# Patient Record
Sex: FEMALE | Race: WHITE | NOT HISPANIC OR LATINO | Employment: FULL TIME | URBAN - METROPOLITAN AREA
[De-identification: names, ages, dates, MRNs, and addresses within clinical notes are randomized per-mention and may not be internally consistent; named-entity substitution may affect disease eponyms.]

---

## 2019-01-17 ENCOUNTER — HOSPITAL ENCOUNTER (OUTPATIENT)
Dept: URGENT CARE | Facility: CLINIC | Age: 24
Discharge: HOME | End: 2019-01-17
Attending: PHYSICIAN ASSISTANT

## 2019-01-17 LAB
C TRACH RRNA CVX QL NAA+PROBE: NOT DETECTED
N GONORRHOEA DNA SPEC QL NAA+PROBE: NOT DETECTED

## 2019-01-19 LAB — BACTERIA UR CULT: NORMAL

## 2019-01-24 ENCOUNTER — HOSPITAL ENCOUNTER (OUTPATIENT)
Dept: URGENT CARE | Facility: CLINIC | Age: 24
Discharge: HOME | End: 2019-01-24

## 2019-01-24 LAB
C TRACH RRNA CVX QL NAA+PROBE: NOT DETECTED
N GONORRHOEA DNA SPEC QL NAA+PROBE: NOT DETECTED

## 2019-02-05 ENCOUNTER — HOSPITAL ENCOUNTER (OUTPATIENT)
Dept: URGENT CARE | Facility: CLINIC | Age: 24
Discharge: HOME | End: 2019-02-05

## 2019-02-08 LAB — BACTERIA SPEC AEROBE CULT: NORMAL

## 2019-03-22 ENCOUNTER — OFFICE VISIT CONVERTED (OUTPATIENT)
Dept: OTOLARYNGOLOGY | Facility: CLINIC | Age: 24
End: 2019-03-22
Attending: OTOLARYNGOLOGY

## 2019-03-26 ENCOUNTER — HOSPITAL ENCOUNTER (OUTPATIENT)
Dept: PERIOP | Facility: HOSPITAL | Age: 24
Setting detail: HOSPITAL OUTPATIENT SURGERY
Discharge: HOME | End: 2019-03-26
Attending: OTOLARYNGOLOGY

## 2019-03-26 LAB — HCG UR QL: NEGATIVE

## 2019-03-31 ENCOUNTER — HOSPITAL ENCOUNTER (OUTPATIENT)
Dept: URGENT CARE | Facility: CLINIC | Age: 24
Discharge: HOME | End: 2019-03-31
Attending: FAMILY MEDICINE

## 2019-04-02 LAB — BACTERIA SPEC AEROBE CULT: NORMAL

## 2019-04-03 ENCOUNTER — OFFICE VISIT CONVERTED (OUTPATIENT)
Dept: OTOLARYNGOLOGY | Facility: CLINIC | Age: 24
End: 2019-04-03
Attending: OTOLARYNGOLOGY

## 2019-05-10 ENCOUNTER — HOSPITAL ENCOUNTER (OUTPATIENT)
Dept: URGENT CARE | Facility: CLINIC | Age: 24
Discharge: HOME | End: 2019-05-10
Attending: FAMILY MEDICINE

## 2019-05-10 LAB
C TRACH RRNA CVX QL NAA+PROBE: NOT DETECTED
N GONORRHOEA DNA SPEC QL NAA+PROBE: NOT DETECTED

## 2019-05-12 LAB — BACTERIA UR CULT: NORMAL

## 2019-07-11 ENCOUNTER — HOSPITAL ENCOUNTER (OUTPATIENT)
Dept: URGENT CARE | Facility: CLINIC | Age: 24
Discharge: HOME | End: 2019-07-11
Attending: FAMILY MEDICINE

## 2019-07-14 LAB — BACTERIA UR CULT: NORMAL

## 2019-07-22 ENCOUNTER — HOSPITAL ENCOUNTER (OUTPATIENT)
Dept: URGENT CARE | Facility: CLINIC | Age: 24
Discharge: HOME | End: 2019-07-22

## 2019-08-05 ENCOUNTER — HOSPITAL ENCOUNTER (OUTPATIENT)
Dept: URGENT CARE | Facility: CLINIC | Age: 24
Discharge: HOME | End: 2019-08-05
Attending: FAMILY MEDICINE

## 2019-08-25 ENCOUNTER — HOSPITAL ENCOUNTER (OUTPATIENT)
Dept: URGENT CARE | Facility: CLINIC | Age: 24
Discharge: HOME | End: 2019-08-25
Attending: FAMILY MEDICINE

## 2019-08-26 LAB
BASOPHILS # BLD AUTO: 0.07 10*3/UL (ref 0–0.2)
BASOPHILS NFR BLD AUTO: 0.8 % (ref 0–3)
CONV ABS IMM GRAN: 0.02 10*3/UL (ref 0–0.2)
CONV IMMATURE GRAN: 0.2 % (ref 0–1.8)
DEPRECATED RDW RBC AUTO: 47.3 FL (ref 36.4–46.3)
EOSINOPHIL # BLD AUTO: 0.19 10*3/UL (ref 0–0.7)
EOSINOPHIL # BLD AUTO: 2.1 % (ref 0–7)
ERYTHROCYTE [DISTWIDTH] IN BLOOD BY AUTOMATED COUNT: 15.8 % (ref 11.7–14.4)
HCG INTACT+B SERPL-ACNC: <0.5 M[IU]/ML (ref 0–5)
HCT VFR BLD AUTO: 37.4 % (ref 37–47)
HGB BLD-MCNC: 11.7 G/DL (ref 12–16)
LYMPHOCYTES # BLD AUTO: 1.88 10*3/UL (ref 1–5)
LYMPHOCYTES NFR BLD AUTO: 20.5 % (ref 20–45)
MCH RBC QN AUTO: 25.7 PG (ref 27–31)
MCHC RBC AUTO-ENTMCNC: 31.3 G/DL (ref 33–37)
MCV RBC AUTO: 82.2 FL (ref 81–99)
MONOCYTES # BLD AUTO: 0.7 10*3/UL (ref 0.2–1.2)
MONOCYTES NFR BLD AUTO: 7.6 % (ref 3–10)
NEUTROPHILS # BLD AUTO: 6.3 10*3/UL (ref 2–8)
NEUTROPHILS NFR BLD AUTO: 68.8 % (ref 30–85)
NRBC CBCN: 0 % (ref 0–0.7)
PLATELET # BLD AUTO: 232 10*3/UL (ref 130–400)
PMV BLD AUTO: 12.9 FL (ref 9.4–12.3)
RBC # BLD AUTO: 4.55 10*6/UL (ref 4.2–5.4)
WBC # BLD AUTO: 9.16 10*3/UL (ref 4.8–10.8)

## 2019-08-29 ENCOUNTER — HOSPITAL ENCOUNTER (OUTPATIENT)
Dept: PERIOP | Facility: HOSPITAL | Age: 24
Setting detail: HOSPITAL OUTPATIENT SURGERY
Discharge: HOME | End: 2019-08-29
Attending: OBSTETRICS & GYNECOLOGY

## 2019-08-29 LAB
ABO GROUP BLD: NORMAL
BLD GP AB SCN SERPL QL: NORMAL
CONV ABD CONTROL: NORMAL
RH BLD: NORMAL

## 2019-09-24 ENCOUNTER — HOSPITAL ENCOUNTER (OUTPATIENT)
Dept: URGENT CARE | Facility: CLINIC | Age: 24
Discharge: HOME | End: 2019-09-24

## 2019-11-05 ENCOUNTER — HOSPITAL ENCOUNTER (OUTPATIENT)
Dept: URGENT CARE | Facility: CLINIC | Age: 24
Discharge: HOME | End: 2019-11-05

## 2019-11-05 ENCOUNTER — HOSPITAL ENCOUNTER (OUTPATIENT)
Dept: PREADMISSION TESTING | Facility: HOSPITAL | Age: 24
Discharge: HOME | End: 2019-11-05
Attending: OBSTETRICS & GYNECOLOGY

## 2019-11-06 LAB
C TRACH RRNA CVX QL NAA+PROBE: NOT DETECTED
N GONORRHOEA DNA SPEC QL NAA+PROBE: NOT DETECTED

## 2020-01-21 ENCOUNTER — HOSPITAL ENCOUNTER (OUTPATIENT)
Dept: URGENT CARE | Facility: CLINIC | Age: 25
Discharge: HOME | End: 2020-01-21

## 2020-01-23 LAB — BACTERIA SPEC AEROBE CULT: ABNORMAL

## 2020-03-10 ENCOUNTER — HOSPITAL ENCOUNTER (OUTPATIENT)
Dept: URGENT CARE | Facility: CLINIC | Age: 25
Discharge: HOME | End: 2020-03-10
Attending: FAMILY MEDICINE

## 2020-03-12 ENCOUNTER — HOSPITAL ENCOUNTER (OUTPATIENT)
Dept: URGENT CARE | Facility: CLINIC | Age: 25
Discharge: HOME | End: 2020-03-12
Attending: NURSE PRACTITIONER

## 2020-03-12 LAB — BACTERIA SPEC AEROBE CULT: NORMAL

## 2020-03-14 LAB — BACTERIA SPEC AEROBE CULT: NORMAL

## 2020-05-06 ENCOUNTER — HOSPITAL ENCOUNTER (OUTPATIENT)
Dept: URGENT CARE | Facility: CLINIC | Age: 25
Discharge: HOME | End: 2020-05-06
Attending: NURSE PRACTITIONER

## 2020-05-08 LAB
BACTERIA SPEC AEROBE CULT: NORMAL
SARS-COV-2 RNA SPEC QL NAA+PROBE: NOT DETECTED

## 2020-06-11 ENCOUNTER — HOSPITAL ENCOUNTER (OUTPATIENT)
Dept: CT IMAGING | Facility: HOSPITAL | Age: 25
Discharge: HOME | End: 2020-06-11
Attending: NURSE PRACTITIONER

## 2020-09-07 ENCOUNTER — HOSPITAL ENCOUNTER (OUTPATIENT)
Dept: URGENT CARE | Facility: CLINIC | Age: 25
Discharge: HOME | End: 2020-09-07
Attending: PHYSICIAN ASSISTANT

## 2020-10-30 ENCOUNTER — OFFICE VISIT CONVERTED (OUTPATIENT)
Dept: UROLOGY | Facility: CLINIC | Age: 25
End: 2020-10-30
Attending: NURSE PRACTITIONER

## 2020-11-10 ENCOUNTER — HOSPITAL ENCOUNTER (OUTPATIENT)
Dept: OTHER | Facility: HOSPITAL | Age: 25
Discharge: HOME | End: 2020-11-10
Attending: NURSE PRACTITIONER

## 2020-11-12 ENCOUNTER — TELEPHONE CONVERTED (OUTPATIENT)
Dept: UROLOGY | Facility: CLINIC | Age: 25
End: 2020-11-12
Attending: NURSE PRACTITIONER

## 2020-11-14 ENCOUNTER — HOSPITAL ENCOUNTER (OUTPATIENT)
Dept: URGENT CARE | Facility: CLINIC | Age: 25
Discharge: HOME | End: 2020-11-14

## 2020-11-18 LAB — SARS-COV-2 RNA SPEC QL NAA+PROBE: NOT DETECTED

## 2021-01-31 ENCOUNTER — HOSPITAL ENCOUNTER (OUTPATIENT)
Dept: URGENT CARE | Facility: CLINIC | Age: 26
Discharge: HOME | End: 2021-01-31

## 2021-02-02 LAB — BACTERIA SPEC AEROBE CULT: ABNORMAL

## 2021-04-10 ENCOUNTER — HOSPITAL ENCOUNTER (OUTPATIENT)
Dept: URGENT CARE | Facility: CLINIC | Age: 26
Discharge: HOME | End: 2021-04-10
Attending: NURSE PRACTITIONER

## 2021-04-11 LAB — SARS-COV-2 RNA SPEC QL NAA+PROBE: NOT DETECTED

## 2021-04-12 LAB — BACTERIA SPEC AEROBE CULT: NORMAL

## 2021-05-14 ENCOUNTER — HOSPITAL ENCOUNTER (OUTPATIENT)
Dept: PREADMISSION TESTING | Facility: HOSPITAL | Age: 26
Discharge: HOME | End: 2021-05-14
Attending: OBSTETRICS & GYNECOLOGY

## 2021-05-14 VITALS — HEIGHT: 60 IN | BODY MASS INDEX: 25.32 KG/M2 | WEIGHT: 129 LBS | RESPIRATION RATE: 12 BRPM

## 2021-05-14 LAB
BASOPHILS # BLD AUTO: 0.09 10*3/UL (ref 0–0.2)
BASOPHILS NFR BLD AUTO: 1.2 % (ref 0–3)
CONV ABS IMM GRAN: 0.01 10*3/UL (ref 0–0.2)
CONV IMMATURE GRAN: 0.1 % (ref 0–1.8)
DEPRECATED RDW RBC AUTO: 41.8 FL (ref 36.4–46.3)
EOSINOPHIL # BLD AUTO: 0.13 10*3/UL (ref 0–0.7)
EOSINOPHIL # BLD AUTO: 1.7 % (ref 0–7)
ERYTHROCYTE [DISTWIDTH] IN BLOOD BY AUTOMATED COUNT: 12.9 % (ref 11.7–14.4)
HCG INTACT+B SERPL-ACNC: <0.5 M[IU]/ML (ref 0–5)
HCT VFR BLD AUTO: 40.1 % (ref 37–47)
HGB BLD-MCNC: 13.6 G/DL (ref 12–16)
LYMPHOCYTES # BLD AUTO: 2 10*3/UL (ref 1–5)
LYMPHOCYTES NFR BLD AUTO: 26.5 % (ref 20–45)
MCH RBC QN AUTO: 30 PG (ref 27–31)
MCHC RBC AUTO-ENTMCNC: 33.9 G/DL (ref 33–37)
MCV RBC AUTO: 88.3 FL (ref 81–99)
MONOCYTES # BLD AUTO: 0.53 10*3/UL (ref 0.2–1.2)
MONOCYTES NFR BLD AUTO: 7 % (ref 3–10)
NEUTROPHILS # BLD AUTO: 4.8 10*3/UL (ref 2–8)
NEUTROPHILS NFR BLD AUTO: 63.5 % (ref 30–85)
NRBC CBCN: 0 % (ref 0–0.7)
PLATELET # BLD AUTO: 236 10*3/UL (ref 130–400)
PMV BLD AUTO: 12.1 FL (ref 9.4–12.3)
RBC # BLD AUTO: 4.54 10*6/UL (ref 4.2–5.4)
SARS-COV-2 RNA SPEC QL NAA+PROBE: NOT DETECTED
WBC # BLD AUTO: 7.56 10*3/UL (ref 4.8–10.8)

## 2021-05-15 VITALS
SYSTOLIC BLOOD PRESSURE: 116 MMHG | BODY MASS INDEX: 24.05 KG/M2 | HEIGHT: 60 IN | WEIGHT: 122.5 LBS | TEMPERATURE: 98.8 F | RESPIRATION RATE: 15 BRPM | DIASTOLIC BLOOD PRESSURE: 75 MMHG | OXYGEN SATURATION: 99 % | HEART RATE: 83 BPM

## 2021-05-15 VITALS
OXYGEN SATURATION: 99 % | WEIGHT: 125.5 LBS | DIASTOLIC BLOOD PRESSURE: 73 MMHG | BODY MASS INDEX: 24.64 KG/M2 | HEIGHT: 60 IN | RESPIRATION RATE: 16 BRPM | SYSTOLIC BLOOD PRESSURE: 128 MMHG | TEMPERATURE: 98.7 F | HEART RATE: 97 BPM

## 2021-05-20 ENCOUNTER — HOSPITAL ENCOUNTER (OUTPATIENT)
Dept: PERIOP | Facility: HOSPITAL | Age: 26
Setting detail: HOSPITAL OUTPATIENT SURGERY
Discharge: HOME | End: 2021-05-21
Attending: OBSTETRICS & GYNECOLOGY

## 2021-05-20 LAB
ABO GROUP BLD: NORMAL
BLD GP AB SCN SERPL QL: NORMAL
CONV ABD CONTROL: NORMAL
HCT VFR BLD AUTO: 35.9 % (ref 37–47)
HGB BLD-MCNC: 12.3 G/DL (ref 12–16)
RH BLD: NORMAL

## 2021-05-21 LAB
BASOPHILS # BLD AUTO: 0.04 10*3/UL (ref 0–0.2)
BASOPHILS NFR BLD AUTO: 0.4 % (ref 0–3)
CONV ABS IMM GRAN: 0.06 10*3/UL (ref 0–0.2)
CONV IMMATURE GRAN: 0.6 % (ref 0–1.8)
DEPRECATED RDW RBC AUTO: 41 FL (ref 36.4–46.3)
EOSINOPHIL # BLD AUTO: 0.06 10*3/UL (ref 0–0.7)
EOSINOPHIL # BLD AUTO: 0.6 % (ref 0–7)
ERYTHROCYTE [DISTWIDTH] IN BLOOD BY AUTOMATED COUNT: 12.6 % (ref 11.7–14.4)
HCT VFR BLD AUTO: 30.7 % (ref 37–47)
HGB BLD-MCNC: 10.1 G/DL (ref 12–16)
LYMPHOCYTES # BLD AUTO: 2.76 10*3/UL (ref 1–5)
LYMPHOCYTES NFR BLD AUTO: 27.2 % (ref 20–45)
MCH RBC QN AUTO: 29.5 PG (ref 27–31)
MCHC RBC AUTO-ENTMCNC: 32.9 G/DL (ref 33–37)
MCV RBC AUTO: 89.8 FL (ref 81–99)
MONOCYTES # BLD AUTO: 0.66 10*3/UL (ref 0.2–1.2)
MONOCYTES NFR BLD AUTO: 6.5 % (ref 3–10)
NEUTROPHILS # BLD AUTO: 6.56 10*3/UL (ref 2–8)
NEUTROPHILS NFR BLD AUTO: 64.7 % (ref 30–85)
NRBC CBCN: 0 % (ref 0–0.7)
PLATELET # BLD AUTO: 166 10*3/UL (ref 130–400)
PMV BLD AUTO: 11.4 FL (ref 9.4–12.3)
RBC # BLD AUTO: 3.42 10*6/UL (ref 4.2–5.4)
WBC # BLD AUTO: 10.14 10*3/UL (ref 4.8–10.8)

## 2021-08-12 PROCEDURE — U0003 INFECTIOUS AGENT DETECTION BY NUCLEIC ACID (DNA OR RNA); SEVERE ACUTE RESPIRATORY SYNDROME CORONAVIRUS 2 (SARS-COV-2) (CORONAVIRUS DISEASE [COVID-19]), AMPLIFIED PROBE TECHNIQUE, MAKING USE OF HIGH THROUGHPUT TECHNOLOGIES AS DESCRIBED BY CMS-2020-01-R: HCPCS | Performed by: PHYSICIAN ASSISTANT

## 2022-01-19 ENCOUNTER — TELEPHONE (OUTPATIENT)
Dept: OBSTETRICS AND GYNECOLOGY | Facility: CLINIC | Age: 27
End: 2022-01-19

## 2022-01-31 ENCOUNTER — OFFICE VISIT (OUTPATIENT)
Dept: OBSTETRICS AND GYNECOLOGY | Facility: CLINIC | Age: 27
End: 2022-01-31

## 2022-01-31 VITALS
DIASTOLIC BLOOD PRESSURE: 77 MMHG | WEIGHT: 139 LBS | SYSTOLIC BLOOD PRESSURE: 119 MMHG | HEART RATE: 76 BPM | HEIGHT: 60 IN | BODY MASS INDEX: 27.29 KG/M2

## 2022-01-31 DIAGNOSIS — Z72.0 TOBACCO ABUSE: ICD-10-CM

## 2022-01-31 DIAGNOSIS — R10.2 PELVIC PAIN: Primary | ICD-10-CM

## 2022-01-31 PROBLEM — J34.2 NASAL SEPTAL DEFORMITY: Status: ACTIVE | Noted: 2022-01-31

## 2022-01-31 PROBLEM — F41.9 ANXIETY: Status: ACTIVE | Noted: 2022-01-31

## 2022-01-31 PROCEDURE — 99213 OFFICE O/P EST LOW 20 MIN: CPT | Performed by: OBSTETRICS & GYNECOLOGY

## 2022-02-01 PROBLEM — R10.2 PELVIC PAIN: Status: ACTIVE | Noted: 2022-02-01

## 2022-02-16 ENCOUNTER — HOSPITAL ENCOUNTER (OUTPATIENT)
Dept: ULTRASOUND IMAGING | Facility: HOSPITAL | Age: 27
Discharge: HOME | End: 2022-02-16
Admitting: OBSTETRICS & GYNECOLOGY

## 2022-02-16 DIAGNOSIS — R10.2 PELVIC PAIN: ICD-10-CM

## 2022-02-16 PROCEDURE — 76856 US EXAM PELVIC COMPLETE: CPT

## 2022-02-16 PROCEDURE — 76830 TRANSVAGINAL US NON-OB: CPT

## 2022-03-08 ENCOUNTER — OFFICE VISIT (OUTPATIENT)
Dept: OBSTETRICS AND GYNECOLOGY | Facility: CLINIC | Age: 27
End: 2022-03-08

## 2022-03-08 VITALS
DIASTOLIC BLOOD PRESSURE: 79 MMHG | WEIGHT: 137.4 LBS | SYSTOLIC BLOOD PRESSURE: 111 MMHG | BODY MASS INDEX: 26.83 KG/M2 | HEART RATE: 76 BPM

## 2022-03-08 DIAGNOSIS — R10.2 PELVIC PAIN: Primary | ICD-10-CM

## 2022-03-08 PROCEDURE — 99213 OFFICE O/P EST LOW 20 MIN: CPT | Performed by: OBSTETRICS & GYNECOLOGY

## 2022-03-14 ENCOUNTER — OFFICE VISIT (OUTPATIENT)
Dept: GASTROENTEROLOGY | Facility: CLINIC | Age: 27
End: 2022-03-14

## 2022-03-14 VITALS
HEART RATE: 93 BPM | DIASTOLIC BLOOD PRESSURE: 80 MMHG | HEIGHT: 60 IN | BODY MASS INDEX: 27.48 KG/M2 | SYSTOLIC BLOOD PRESSURE: 122 MMHG | WEIGHT: 140 LBS

## 2022-03-14 DIAGNOSIS — R10.31 RIGHT LOWER QUADRANT ABDOMINAL PAIN: ICD-10-CM

## 2022-03-14 DIAGNOSIS — R10.11 RIGHT UPPER QUADRANT ABDOMINAL PAIN: Primary | ICD-10-CM

## 2022-03-14 DIAGNOSIS — R15.2 FECAL URGENCY: ICD-10-CM

## 2022-03-14 PROCEDURE — 99214 OFFICE O/P EST MOD 30 MIN: CPT | Performed by: NURSE PRACTITIONER

## 2022-03-14 RX ORDER — DICYCLOMINE HYDROCHLORIDE 10 MG/1
10 CAPSULE ORAL 4 TIMES DAILY PRN
Qty: 120 CAPSULE | Refills: 3 | Status: SHIPPED | OUTPATIENT
Start: 2022-03-14 | End: 2022-04-27

## 2022-03-14 RX ORDER — POLYETHYLENE GLYCOL 3350 17 G/17G
17 POWDER, FOR SOLUTION ORAL DAILY
Qty: 30 EACH | Refills: 3 | Status: SHIPPED | OUTPATIENT
Start: 2022-03-14 | End: 2022-04-27

## 2022-03-24 ENCOUNTER — PATIENT ROUNDING (BHMG ONLY) (OUTPATIENT)
Dept: GASTROENTEROLOGY | Facility: CLINIC | Age: 27
End: 2022-03-24

## 2022-04-01 ENCOUNTER — HOSPITAL ENCOUNTER (OUTPATIENT)
Dept: NUCLEAR MEDICINE | Facility: HOSPITAL | Age: 27
Discharge: HOME | End: 2022-04-01

## 2022-04-01 ENCOUNTER — HOSPITAL ENCOUNTER (OUTPATIENT)
Dept: ULTRASOUND IMAGING | Facility: HOSPITAL | Age: 27
Discharge: HOME | End: 2022-04-01

## 2022-04-01 DIAGNOSIS — R15.2 FECAL URGENCY: ICD-10-CM

## 2022-04-01 DIAGNOSIS — R10.11 RIGHT UPPER QUADRANT ABDOMINAL PAIN: ICD-10-CM

## 2022-04-01 PROCEDURE — 76705 ECHO EXAM OF ABDOMEN: CPT

## 2022-04-01 PROCEDURE — A9537 TC99M MEBROFENIN: HCPCS | Performed by: NURSE PRACTITIONER

## 2022-04-01 PROCEDURE — 78227 HEPATOBIL SYST IMAGE W/DRUG: CPT

## 2022-04-01 PROCEDURE — 0 TECHNETIUM TC 99M MEBROFENIN KIT: Performed by: NURSE PRACTITIONER

## 2022-04-01 RX ORDER — KIT FOR THE PREPARATION OF TECHNETIUM TC 99M MEBROFENIN 45 MG/10ML
1 INJECTION, POWDER, LYOPHILIZED, FOR SOLUTION INTRAVENOUS
Status: COMPLETED | OUTPATIENT
Start: 2022-04-01 | End: 2022-04-01

## 2022-04-01 RX ADMIN — KIT FOR THE PREPARATION OF TECHNETIUM TC 99M MEBROFENIN 1 DOSE: 45 INJECTION, POWDER, LYOPHILIZED, FOR SOLUTION INTRAVENOUS at 11:38

## 2022-07-14 ENCOUNTER — TRANSCRIBE ORDERS (OUTPATIENT)
Dept: ADMINISTRATIVE | Facility: HOSPITAL | Age: 27
End: 2022-07-14

## 2022-07-14 DIAGNOSIS — R10.2 PELVIC PAIN: Primary | ICD-10-CM

## 2022-07-25 ENCOUNTER — HOSPITAL ENCOUNTER (OUTPATIENT)
Dept: ULTRASOUND IMAGING | Facility: HOSPITAL | Age: 27
Discharge: HOME | End: 2022-07-25
Admitting: NURSE PRACTITIONER

## 2022-07-25 DIAGNOSIS — R10.2 PELVIC PAIN: ICD-10-CM

## 2022-07-25 PROCEDURE — 76856 US EXAM PELVIC COMPLETE: CPT

## 2022-11-28 ENCOUNTER — TRANSCRIBE ORDERS (OUTPATIENT)
Dept: ADMINISTRATIVE | Facility: HOSPITAL | Age: 27
End: 2022-11-28

## 2022-11-28 ENCOUNTER — HOSPITAL ENCOUNTER (OUTPATIENT)
Dept: GENERAL RADIOLOGY | Facility: HOSPITAL | Age: 27
Discharge: HOME | End: 2022-11-28
Admitting: NURSE PRACTITIONER

## 2022-11-28 DIAGNOSIS — R05.9 COUGH, UNSPECIFIED TYPE: Primary | ICD-10-CM

## 2022-11-28 DIAGNOSIS — R06.89 DECREASED BREATH SOUNDS: ICD-10-CM

## 2022-11-28 DIAGNOSIS — R05.9 COUGH, UNSPECIFIED TYPE: ICD-10-CM

## 2022-11-28 PROCEDURE — 71046 X-RAY EXAM CHEST 2 VIEWS: CPT

## 2024-02-12 ENCOUNTER — TRANSCRIBE ORDERS (OUTPATIENT)
Dept: ADMINISTRATIVE | Facility: HOSPITAL | Age: 29
End: 2024-02-12
Payer: COMMERCIAL

## 2024-02-12 DIAGNOSIS — G43.809 OTHER MIGRAINE WITHOUT STATUS MIGRAINOSUS, NOT INTRACTABLE: Primary | ICD-10-CM

## 2024-03-21 ENCOUNTER — HOSPITAL ENCOUNTER (OUTPATIENT)
Dept: MRI IMAGING | Facility: HOSPITAL | Age: 29
Discharge: HOME | End: 2024-03-21
Admitting: NURSE PRACTITIONER
Payer: COMMERCIAL

## 2024-03-21 DIAGNOSIS — G43.809 OTHER MIGRAINE WITHOUT STATUS MIGRAINOSUS, NOT INTRACTABLE: ICD-10-CM

## 2024-03-21 PROCEDURE — A9577 INJ MULTIHANCE: HCPCS | Performed by: NURSE PRACTITIONER

## 2024-03-21 PROCEDURE — 70553 MRI BRAIN STEM W/O & W/DYE: CPT

## 2024-03-21 PROCEDURE — 0 GADOBENATE DIMEGLUMINE 529 MG/ML SOLUTION: Performed by: NURSE PRACTITIONER

## 2024-03-21 RX ADMIN — GADOBENATE DIMEGLUMINE 13 ML: 529 INJECTION, SOLUTION INTRAVENOUS at 14:52

## 2024-04-21 PROCEDURE — 87635 SARS-COV-2 COVID-19 AMP PRB: CPT | Performed by: PHYSICIAN ASSISTANT

## 2024-06-03 ENCOUNTER — TRANSCRIBE ORDERS (OUTPATIENT)
Dept: ADMINISTRATIVE | Facility: HOSPITAL | Age: 29
End: 2024-06-03
Payer: COMMERCIAL

## 2024-06-03 DIAGNOSIS — R60.0 LEG EDEMA: ICD-10-CM

## 2024-06-03 DIAGNOSIS — R60.0 LOCALIZED EDEMA: Primary | ICD-10-CM

## 2024-06-06 ENCOUNTER — HOSPITAL ENCOUNTER (OUTPATIENT)
Dept: CARDIOLOGY | Facility: HOSPITAL | Age: 29
Discharge: HOME | End: 2024-06-06
Payer: COMMERCIAL

## 2024-06-06 DIAGNOSIS — R60.0 LEG EDEMA: ICD-10-CM

## 2024-06-06 LAB
BH CV LOWER VASCULAR LEFT COMMON FEMORAL AUGMENT: NORMAL
BH CV LOWER VASCULAR LEFT COMMON FEMORAL COMPETENT: NORMAL
BH CV LOWER VASCULAR LEFT COMMON FEMORAL COMPRESS: NORMAL
BH CV LOWER VASCULAR LEFT COMMON FEMORAL PHASIC: NORMAL
BH CV LOWER VASCULAR LEFT COMMON FEMORAL SPONT: NORMAL
BH CV LOWER VASCULAR LEFT DISTAL FEMORAL COMPRESS: NORMAL
BH CV LOWER VASCULAR LEFT GASTRONEMIUS COMPRESS: NORMAL
BH CV LOWER VASCULAR LEFT GREATER SAPH AK AUGMENT: NORMAL
BH CV LOWER VASCULAR LEFT GREATER SAPH AK COMPETENT: NORMAL
BH CV LOWER VASCULAR LEFT GREATER SAPH AK COMPRESS: NORMAL
BH CV LOWER VASCULAR LEFT GREATER SAPH AK PHASIC: NORMAL
BH CV LOWER VASCULAR LEFT GREATER SAPH AK SPONT: NORMAL
BH CV LOWER VASCULAR LEFT GREATER SAPH BK COMPRESS: NORMAL
BH CV LOWER VASCULAR LEFT LESSER SAPH COMPRESS: NORMAL
BH CV LOWER VASCULAR LEFT MID FEMORAL AUGMENT: NORMAL
BH CV LOWER VASCULAR LEFT MID FEMORAL COMPETENT: NORMAL
BH CV LOWER VASCULAR LEFT MID FEMORAL COMPRESS: NORMAL
BH CV LOWER VASCULAR LEFT MID FEMORAL PHASIC: NORMAL
BH CV LOWER VASCULAR LEFT MID FEMORAL SPONT: NORMAL
BH CV LOWER VASCULAR LEFT PERONEAL COMPRESS: NORMAL
BH CV LOWER VASCULAR LEFT POPLITEAL AUGMENT: NORMAL
BH CV LOWER VASCULAR LEFT POPLITEAL COMPETENT: NORMAL
BH CV LOWER VASCULAR LEFT POPLITEAL COMPRESS: NORMAL
BH CV LOWER VASCULAR LEFT POPLITEAL PHASIC: NORMAL
BH CV LOWER VASCULAR LEFT POPLITEAL SPONT: NORMAL
BH CV LOWER VASCULAR LEFT POSTERIOR TIBIAL AUGMENT: NORMAL
BH CV LOWER VASCULAR LEFT POSTERIOR TIBIAL COMPETENT: NORMAL
BH CV LOWER VASCULAR LEFT POSTERIOR TIBIAL COMPRESS: NORMAL
BH CV LOWER VASCULAR LEFT POSTERIOR TIBIAL PHASIC: NORMAL
BH CV LOWER VASCULAR LEFT POSTERIOR TIBIAL SPONT: NORMAL
BH CV LOWER VASCULAR LEFT PROXIMAL FEMORAL COMPRESS: NORMAL
BH CV LOWER VASCULAR RIGHT COMMON FEMORAL AUGMENT: NORMAL
BH CV LOWER VASCULAR RIGHT COMMON FEMORAL COMPETENT: NORMAL
BH CV LOWER VASCULAR RIGHT COMMON FEMORAL COMPRESS: NORMAL
BH CV LOWER VASCULAR RIGHT COMMON FEMORAL PHASIC: NORMAL
BH CV LOWER VASCULAR RIGHT COMMON FEMORAL SPONT: NORMAL

## 2024-06-06 PROCEDURE — 93971 EXTREMITY STUDY: CPT

## 2024-10-01 ENCOUNTER — LAB (OUTPATIENT)
Dept: LAB | Facility: HOSPITAL | Age: 29
End: 2024-10-01
Payer: COMMERCIAL

## 2024-10-01 ENCOUNTER — PATIENT ROUNDING (BHMG ONLY) (OUTPATIENT)
Dept: NEUROLOGY | Facility: CLINIC | Age: 29
End: 2024-10-01
Payer: COMMERCIAL

## 2024-10-01 ENCOUNTER — OFFICE VISIT (OUTPATIENT)
Dept: NEUROLOGY | Facility: CLINIC | Age: 29
End: 2024-10-01
Payer: COMMERCIAL

## 2024-10-01 VITALS
HEART RATE: 82 BPM | WEIGHT: 144.8 LBS | SYSTOLIC BLOOD PRESSURE: 114 MMHG | BODY MASS INDEX: 28.43 KG/M2 | DIASTOLIC BLOOD PRESSURE: 71 MMHG | HEIGHT: 60 IN

## 2024-10-01 DIAGNOSIS — G43.719 INTRACTABLE CHRONIC MIGRAINE WITHOUT AURA AND WITHOUT STATUS MIGRAINOSUS: ICD-10-CM

## 2024-10-01 DIAGNOSIS — G43.719 INTRACTABLE CHRONIC MIGRAINE WITHOUT AURA AND WITHOUT STATUS MIGRAINOSUS: Primary | ICD-10-CM

## 2024-10-01 LAB
ALBUMIN SERPL-MCNC: 4.4 G/DL (ref 3.5–5.2)
ALBUMIN/GLOB SERPL: 1.7 G/DL
ALP SERPL-CCNC: 56 U/L (ref 39–117)
ALT SERPL W P-5'-P-CCNC: 14 U/L (ref 1–33)
ANION GAP SERPL CALCULATED.3IONS-SCNC: 9.7 MMOL/L (ref 5–15)
AST SERPL-CCNC: 13 U/L (ref 1–32)
BILIRUB SERPL-MCNC: 0.4 MG/DL (ref 0–1.2)
BUN SERPL-MCNC: 14 MG/DL (ref 6–20)
BUN/CREAT SERPL: 21.5 (ref 7–25)
CALCIUM SPEC-SCNC: 9.4 MG/DL (ref 8.6–10.5)
CHLORIDE SERPL-SCNC: 105 MMOL/L (ref 98–107)
CO2 SERPL-SCNC: 24.3 MMOL/L (ref 22–29)
CREAT SERPL-MCNC: 0.65 MG/DL (ref 0.57–1)
DEPRECATED RDW RBC AUTO: 39.8 FL (ref 37–54)
EGFRCR SERPLBLD CKD-EPI 2021: 121.6 ML/MIN/1.73
ERYTHROCYTE [DISTWIDTH] IN BLOOD BY AUTOMATED COUNT: 12 % (ref 12.3–15.4)
FOLATE SERPL-MCNC: 6 NG/ML (ref 4.78–24.2)
GLOBULIN UR ELPH-MCNC: 2.6 GM/DL
GLUCOSE SERPL-MCNC: 83 MG/DL (ref 65–99)
HCT VFR BLD AUTO: 38.2 % (ref 34–46.6)
HGB BLD-MCNC: 12.8 G/DL (ref 12–15.9)
MAGNESIUM SERPL-MCNC: 1.9 MG/DL (ref 1.6–2.6)
MCH RBC QN AUTO: 30.7 PG (ref 26.6–33)
MCHC RBC AUTO-ENTMCNC: 33.5 G/DL (ref 31.5–35.7)
MCV RBC AUTO: 91.6 FL (ref 79–97)
PLATELET # BLD AUTO: 254 10*3/MM3 (ref 140–450)
PMV BLD AUTO: 12.1 FL (ref 6–12)
POTASSIUM SERPL-SCNC: 4 MMOL/L (ref 3.5–5.2)
PROT SERPL-MCNC: 7 G/DL (ref 6–8.5)
RBC # BLD AUTO: 4.17 10*6/MM3 (ref 3.77–5.28)
SODIUM SERPL-SCNC: 139 MMOL/L (ref 136–145)
VIT B12 BLD-MCNC: 462 PG/ML (ref 211–946)
WBC NRBC COR # BLD AUTO: 8.83 10*3/MM3 (ref 3.4–10.8)

## 2024-10-01 PROCEDURE — 80053 COMPREHEN METABOLIC PANEL: CPT

## 2024-10-01 PROCEDURE — 83735 ASSAY OF MAGNESIUM: CPT

## 2024-10-01 PROCEDURE — 83921 ORGANIC ACID SINGLE QUANT: CPT

## 2024-10-01 PROCEDURE — 82746 ASSAY OF FOLIC ACID SERUM: CPT

## 2024-10-01 PROCEDURE — 36415 COLL VENOUS BLD VENIPUNCTURE: CPT

## 2024-10-01 PROCEDURE — 82607 VITAMIN B-12: CPT

## 2024-10-01 PROCEDURE — 85027 COMPLETE CBC AUTOMATED: CPT

## 2024-10-01 RX ORDER — ATOGEPANT 60 MG/1
TABLET ORAL DAILY
COMMUNITY
Start: 2024-07-26 | End: 2024-10-01

## 2024-10-01 RX ORDER — SUMATRIPTAN 100 MG/1
TABLET, FILM COATED ORAL
Qty: 9 TABLET | Refills: 5 | Status: SHIPPED | OUTPATIENT
Start: 2024-10-01

## 2024-10-01 RX ORDER — GALCANEZUMAB 120 MG/ML
INJECTION, SOLUTION SUBCUTANEOUS
COMMUNITY
Start: 2024-09-04 | End: 2024-10-01

## 2024-10-01 RX ORDER — GALCANEZUMAB 120 MG/ML
120 INJECTION, SOLUTION SUBCUTANEOUS
Qty: 1 EACH | Refills: 5 | Status: SHIPPED | OUTPATIENT
Start: 2024-10-01

## 2024-10-01 RX ORDER — HYDROXYZINE HYDROCHLORIDE 25 MG/1
25 TABLET, FILM COATED ORAL AS NEEDED
COMMUNITY
Start: 2024-08-15

## 2024-10-04 PROBLEM — G43.719 INTRACTABLE CHRONIC MIGRAINE WITHOUT AURA AND WITHOUT STATUS MIGRAINOSUS: Status: ACTIVE | Noted: 2024-10-04

## 2024-10-11 LAB — METHYLMALONATE SERPL-SCNC: 136 NMOL/L (ref 0–378)

## 2025-01-02 ENCOUNTER — OFFICE VISIT (OUTPATIENT)
Dept: NEUROLOGY | Facility: CLINIC | Age: 30
End: 2025-01-02
Payer: COMMERCIAL

## 2025-01-02 VITALS
SYSTOLIC BLOOD PRESSURE: 115 MMHG | HEIGHT: 60 IN | HEART RATE: 74 BPM | WEIGHT: 150.6 LBS | BODY MASS INDEX: 29.57 KG/M2 | DIASTOLIC BLOOD PRESSURE: 70 MMHG

## 2025-01-02 DIAGNOSIS — G43.719 INTRACTABLE CHRONIC MIGRAINE WITHOUT AURA AND WITHOUT STATUS MIGRAINOSUS: Primary | ICD-10-CM

## 2025-01-02 RX ORDER — GALCANEZUMAB 120 MG/ML
120 INJECTION, SOLUTION SUBCUTANEOUS
Qty: 1 EACH | Refills: 5 | Status: SHIPPED | OUTPATIENT
Start: 2025-01-02

## 2025-01-02 RX ORDER — RIZATRIPTAN BENZOATE 10 MG/1
10 TABLET, ORALLY DISINTEGRATING ORAL ONCE AS NEEDED
Qty: 12 TABLET | Refills: 5 | Status: SHIPPED | OUTPATIENT
Start: 2025-01-02 | End: 2025-02-01

## 2025-01-02 NOTE — PROGRESS NOTES
"Chief Complaint  Migraine    Subjective          Tracey Ly presents to [facility]  History of Present Illness    History of Present Illness  The patient is a 29-year-old female who presents to the office for a follow-up for migraines.    At the last visit, she was started on Emgality for preventative therapy and sumatriptan as needed for abortive therapy. She reports a significant reduction in the frequency of her headaches, now experiencing approximately 10 headache-free days per month. However, she experienced adverse effects from sumatriptan, including severe illness and diarrhea, which she attributes to the high dosage of the medication.    MEDICATIONS  Current: Emgality  Discontinued: Sumatriptan       Objective   Vital Signs:   /70   Pulse 74   Ht 152.4 cm (60\")   Wt 68.3 kg (150 lb 9.6 oz)   BMI 29.41 kg/m²     Physical Exam  HENT:      Head: Normocephalic.   Pulmonary:      Effort: Pulmonary effort is normal.   Neurological:      Mental Status: She is alert and oriented to person, place, and time.      Sensory: Sensation is intact.      Motor: Motor function is intact.      Coordination: Coordination is intact.      Deep Tendon Reflexes: Reflexes are normal and symmetric.        Neurological Exam  Mental Status  Alert. Oriented to person, place, and time.    Sensory  Normal sensation.    Reflexes  Deep tendon reflexes are 2+ and symmetric in all four extremities.    Coordination    Finger-to-nose, rapid alternating movements and heel-to-shin normal bilaterally without dysmetria.      Result Review :               Assessment and Plan    There are no diagnoses linked to this encounter.    Assessment & Plan  1. Migraine.  She has shown improvement with Emgality injections but continues to experience approximately 10 headache days per month. Sumatriptan was discontinued due to adverse effects, including nausea and diarrhea. A prescription for " rizatriptan 10 mg, to be taken as needed, will be provided. She is advised to continue with the Emgality injections. If the combination of Emgality and rizatriptan does not yield the desired results, further adjustments will be considered.    Follow-up  The patient will follow up in 3 months.         Follow Up   Return in about 3 months (around 4/2/2025) for Migraine f/u.  Patient was given instructions and counseling regarding her condition or for health maintenance advice. Please see specific information pulled into the AVS if appropriate.       Patient or patient representative verbalized consent for the use of Ambient Listening during the visit with  VENUS Herrera for chart documentation. 1/7/2025  15:31 EST

## 2025-06-09 ENCOUNTER — APPOINTMENT (OUTPATIENT)
Dept: CT IMAGING | Facility: HOSPITAL | Age: 30
End: 2025-06-09
Payer: COMMERCIAL

## 2025-06-09 ENCOUNTER — HOSPITAL ENCOUNTER (EMERGENCY)
Facility: HOSPITAL | Age: 30
Discharge: HOME | End: 2025-06-09
Attending: EMERGENCY MEDICINE | Admitting: EMERGENCY MEDICINE
Payer: COMMERCIAL

## 2025-06-09 VITALS
SYSTOLIC BLOOD PRESSURE: 119 MMHG | RESPIRATION RATE: 16 BRPM | HEART RATE: 81 BPM | HEIGHT: 60 IN | DIASTOLIC BLOOD PRESSURE: 84 MMHG | OXYGEN SATURATION: 99 % | TEMPERATURE: 98.1 F | WEIGHT: 156.97 LBS | BODY MASS INDEX: 30.82 KG/M2

## 2025-06-09 DIAGNOSIS — N76.0 BACTERIAL VAGINOSIS: ICD-10-CM

## 2025-06-09 DIAGNOSIS — B96.89 BACTERIAL VAGINOSIS: ICD-10-CM

## 2025-06-09 DIAGNOSIS — R10.2 SUPRAPUBIC PAIN, ACUTE: Primary | ICD-10-CM

## 2025-06-09 LAB
ALBUMIN SERPL-MCNC: 4.2 G/DL (ref 3.5–5.2)
ALBUMIN/GLOB SERPL: 1.5 G/DL
ALP SERPL-CCNC: 66 U/L (ref 39–117)
ALT SERPL W P-5'-P-CCNC: 20 U/L (ref 1–33)
ANION GAP SERPL CALCULATED.3IONS-SCNC: 10 MMOL/L (ref 5–15)
AST SERPL-CCNC: 14 U/L (ref 1–32)
BACTERIAL VAGINOSIS VAG-IMP: POSITIVE
BASOPHILS # BLD AUTO: 0.07 10*3/MM3 (ref 0–0.2)
BASOPHILS NFR BLD AUTO: 0.9 % (ref 0–1.5)
BILIRUB SERPL-MCNC: 0.3 MG/DL (ref 0–1.2)
BILIRUB UR QL STRIP: NEGATIVE
BUN SERPL-MCNC: 10.4 MG/DL (ref 6–20)
BUN/CREAT SERPL: 18.6 (ref 7–25)
CALCIUM SPEC-SCNC: 8.8 MG/DL (ref 8.6–10.5)
CANDIDA DNA VAG QL NAA+PROBE: NOT DETECTED
CANDIDA DNA VAG QL NAA+PROBE: NOT DETECTED
CHLORIDE SERPL-SCNC: 103 MMOL/L (ref 98–107)
CLARITY UR: CLEAR
CO2 SERPL-SCNC: 24 MMOL/L (ref 22–29)
COLOR UR: YELLOW
CREAT SERPL-MCNC: 0.56 MG/DL (ref 0.57–1)
DEPRECATED RDW RBC AUTO: 40 FL (ref 37–54)
EGFRCR SERPLBLD CKD-EPI 2021: 126.1 ML/MIN/1.73
EOSINOPHIL # BLD AUTO: 0.49 10*3/MM3 (ref 0–0.4)
EOSINOPHIL NFR BLD AUTO: 6.2 % (ref 0.3–6.2)
ERYTHROCYTE [DISTWIDTH] IN BLOOD BY AUTOMATED COUNT: 11.9 % (ref 12.3–15.4)
GLOBULIN UR ELPH-MCNC: 2.8 GM/DL
GLUCOSE SERPL-MCNC: 106 MG/DL (ref 65–99)
GLUCOSE UR STRIP-MCNC: NEGATIVE MG/DL
HCT VFR BLD AUTO: 38.7 % (ref 34–46.6)
HGB BLD-MCNC: 12.7 G/DL (ref 12–15.9)
HGB UR QL STRIP.AUTO: NEGATIVE
HOLD SPECIMEN: NORMAL
HOLD SPECIMEN: NORMAL
IMM GRANULOCYTES # BLD AUTO: 0.02 10*3/MM3 (ref 0–0.05)
IMM GRANULOCYTES NFR BLD AUTO: 0.3 % (ref 0–0.5)
KETONES UR QL STRIP: NEGATIVE
LEUKOCYTE ESTERASE UR QL STRIP.AUTO: NEGATIVE
LIPASE SERPL-CCNC: 24 U/L (ref 13–60)
LYMPHOCYTES # BLD AUTO: 2.26 10*3/MM3 (ref 0.7–3.1)
LYMPHOCYTES NFR BLD AUTO: 28.6 % (ref 19.6–45.3)
MCH RBC QN AUTO: 30.2 PG (ref 26.6–33)
MCHC RBC AUTO-ENTMCNC: 32.8 G/DL (ref 31.5–35.7)
MCV RBC AUTO: 92.1 FL (ref 79–97)
MONOCYTES # BLD AUTO: 0.47 10*3/MM3 (ref 0.1–0.9)
MONOCYTES NFR BLD AUTO: 5.9 % (ref 5–12)
NEUTROPHILS NFR BLD AUTO: 4.59 10*3/MM3 (ref 1.7–7)
NEUTROPHILS NFR BLD AUTO: 58.1 % (ref 42.7–76)
NITRITE UR QL STRIP: NEGATIVE
NRBC BLD AUTO-RTO: 0 /100 WBC (ref 0–0.2)
PH UR STRIP.AUTO: 7.5 [PH] (ref 5–8)
PLATELET # BLD AUTO: 243 10*3/MM3 (ref 140–450)
PMV BLD AUTO: 11.8 FL (ref 6–12)
POTASSIUM SERPL-SCNC: 3.7 MMOL/L (ref 3.5–5.2)
PROT SERPL-MCNC: 7 G/DL (ref 6–8.5)
PROT UR QL STRIP: NEGATIVE
RBC # BLD AUTO: 4.2 10*6/MM3 (ref 3.77–5.28)
SODIUM SERPL-SCNC: 137 MMOL/L (ref 136–145)
SP GR UR STRIP: 1.02 (ref 1–1.03)
T VAGINALIS DNA VAG QL NAA+PROBE: NOT DETECTED
UROBILINOGEN UR QL STRIP: NORMAL
WBC NRBC COR # BLD AUTO: 7.9 10*3/MM3 (ref 3.4–10.8)
WHOLE BLOOD HOLD COAG: NORMAL
WHOLE BLOOD HOLD SPECIMEN: NORMAL

## 2025-06-09 PROCEDURE — 96374 THER/PROPH/DIAG INJ IV PUSH: CPT

## 2025-06-09 PROCEDURE — 25510000001 IOPAMIDOL PER 1 ML: Performed by: EMERGENCY MEDICINE

## 2025-06-09 PROCEDURE — 83690 ASSAY OF LIPASE: CPT

## 2025-06-09 PROCEDURE — 80053 COMPREHEN METABOLIC PANEL: CPT

## 2025-06-09 PROCEDURE — 81003 URINALYSIS AUTO W/O SCOPE: CPT

## 2025-06-09 PROCEDURE — 99285 EMERGENCY DEPT VISIT HI MDM: CPT

## 2025-06-09 PROCEDURE — 85025 COMPLETE CBC W/AUTO DIFF WBC: CPT

## 2025-06-09 PROCEDURE — 25010000002 KETOROLAC TROMETHAMINE PER 15 MG

## 2025-06-09 PROCEDURE — 74177 CT ABD & PELVIS W/CONTRAST: CPT

## 2025-06-09 PROCEDURE — 81515 NFCT DS BV&VAGINITIS DNA ALG: CPT

## 2025-06-09 RX ORDER — SODIUM CHLORIDE 0.9 % (FLUSH) 0.9 %
10 SYRINGE (ML) INJECTION AS NEEDED
Status: DISCONTINUED | OUTPATIENT
Start: 2025-06-09 | End: 2025-06-09 | Stop reason: HOSPADM

## 2025-06-09 RX ORDER — METRONIDAZOLE 500 MG/1
500 TABLET ORAL 2 TIMES DAILY
Qty: 14 TABLET | Refills: 0 | Status: SHIPPED | OUTPATIENT
Start: 2025-06-09 | End: 2025-06-16

## 2025-06-09 RX ORDER — IBUPROFEN 800 MG/1
TABLET, FILM COATED ORAL
COMMUNITY

## 2025-06-09 RX ORDER — TAMSULOSIN HYDROCHLORIDE 0.4 MG/1
CAPSULE ORAL
COMMUNITY
Start: 2025-06-06 | End: 2025-06-09

## 2025-06-09 RX ORDER — KETOROLAC TROMETHAMINE 30 MG/ML
30 INJECTION, SOLUTION INTRAMUSCULAR; INTRAVENOUS ONCE
Status: COMPLETED | OUTPATIENT
Start: 2025-06-09 | End: 2025-06-09

## 2025-06-09 RX ORDER — ALBUTEROL SULFATE 90 UG/1
INHALANT RESPIRATORY (INHALATION)
COMMUNITY

## 2025-06-09 RX ORDER — IOPAMIDOL 755 MG/ML
100 INJECTION, SOLUTION INTRAVASCULAR
Status: COMPLETED | OUTPATIENT
Start: 2025-06-09 | End: 2025-06-09

## 2025-06-09 RX ORDER — ONDANSETRON 4 MG/1
4 TABLET, ORALLY DISINTEGRATING ORAL EVERY 8 HOURS PRN
Qty: 21 TABLET | Refills: 0 | Status: SHIPPED | OUTPATIENT
Start: 2025-06-09

## 2025-06-09 RX ORDER — FEXOFENADINE HCL 180 MG/1
TABLET ORAL
COMMUNITY
Start: 2025-03-07

## 2025-06-09 RX ADMIN — IOPAMIDOL 100 ML: 755 INJECTION, SOLUTION INTRAVENOUS at 17:44

## 2025-06-09 RX ADMIN — KETOROLAC TROMETHAMINE 30 MG: 30 INJECTION, SOLUTION INTRAMUSCULAR; INTRAVENOUS at 17:19

## 2025-06-09 NOTE — ED PROVIDER NOTES
Time: 4:50 PM EDT  Date of encounter:  2025  Independent Historian/Clinical History and Information was obtained by:   Patient    History is limited by: N/A    Chief Complaint: Abdominal pain      History of Present Illness:  Patient is a 29 y.o. year old female who presents to the emergency department for evaluation of lower abdominal pain.  Patient states that has been going for the past week.  Patient went to urgent care approximately 1 week ago and was told she had a UTI and possible kidney stones as she had blood in her urine, she is known menstruating and her last period was in 2019.  Patient was started on antibiotics but no follow-up exam was performed to confirm kidney stone diagnosis.  Patient was seen again by urgent care today who performed a urine however it was negative.      Patient Care Team  Primary Care Provider: Elda Morris APRN    Past Medical History:     No Known Allergies  Past Medical History:   Diagnosis Date    Anemia     Migraine      Past Surgical History:   Procedure Laterality Date     SECTION      X2    ENDOMETRIAL ABLATION W/ NOVASURE      HYSTERECTOMY      TUBAL ABDOMINAL LIGATION       Family History   Problem Relation Age of Onset    Crohn's disease Mother     Rectal cancer Father     Diabetes Father     Cancer Sister        Home Medications:  Prior to Admission medications    Medication Sig Start Date End Date Taking? Authorizing Provider   acyclovir (ZOVIRAX) 800 MG tablet  25   Gisella Toney MD   galcanezumab-gnlm (Emgality) 120 MG/ML auto-injector pen Inject 1 mL under the skin into the appropriate area as directed Every 30 (Thirty) Days. 25   Silvia Coe APRN   nitrofurantoin, macrocrystal-monohydrate, (MACROBID) 100 MG capsule  25   Gisella Toney MD   Atogepant (QULIPTA PO) Take  by mouth.  25  Gisella Toney MD   brompheniramine-pseudoephedrine-DM 30-2-10 MG/5ML syrup Take 10 mL by mouth 3 (Three) Times  "a Day As Needed for Congestion or Cough. 2/23/25 6/9/25  Thang Santiago MD   rizatriptan MLT (MAXALT-MLT) 10 MG disintegrating tablet Place 1 tablet on the tongue 1 (One) Time As Needed for Migraine for up to 30 days. May repeat in 2 hours if needed 1/2/25 6/9/25  Silvia Coe APRN        Social History:   Social History     Tobacco Use    Smoking status: Never    Smokeless tobacco: Never   Vaping Use    Vaping status: Never Used   Substance Use Topics    Alcohol use: Never    Drug use: Never         Review of Systems:  Review of Systems   Constitutional:  Negative for diaphoresis and fever.   Respiratory:  Negative for shortness of breath.    Gastrointestinal:  Positive for abdominal pain and nausea. Negative for constipation, diarrhea and vomiting.   Genitourinary:  Positive for dysuria, hematuria, vaginal discharge and vaginal pain. Negative for pelvic pain.        Physical Exam:  /84 (BP Location: Right arm, Patient Position: Sitting)   Pulse 81   Temp 98.1 °F (36.7 °C) (Oral)   Resp 16   Ht 152.4 cm (60\")   Wt 71.2 kg (156 lb 15.5 oz)   SpO2 99%   BMI 30.66 kg/m²     Physical Exam  HENT:      Head: Normocephalic.      Mouth/Throat:      Mouth: Mucous membranes are moist.   Eyes:      Pupils: Pupils are equal, round, and reactive to light.   Pulmonary:      Effort: Pulmonary effort is normal.   Abdominal:      General: Bowel sounds are normal. There is no distension.      Palpations: Abdomen is soft.      Tenderness: There is abdominal tenderness in the suprapubic area. There is no guarding or rebound.   Musculoskeletal:      Cervical back: Neck supple.      Lumbar back: Tenderness (midline) present.   Skin:     General: Skin is warm and dry.   Neurological:      General: No focal deficit present.      Mental Status: She is alert and oriented to person, place, and time.   Psychiatric:         Mood and Affect: Mood normal.         Behavior: Behavior normal.                  Medical Decision " Making:      Comorbidities that affect care:    None    External Notes reviewed:    None      The following orders were placed and all results were independently analyzed by me:  Orders Placed This Encounter   Procedures    MVP Vaginosis Panel - Swab, Vagina    CT Abdomen Pelvis With Contrast    [place] Draw    Comprehensive Metabolic Panel    Lipase    Urinalysis With Culture If Indicated -    CBC Auto Differential    Insert Peripheral IV    Insert Peripheral IV    CBC & Differential    Green Top (Gel)    Lavender Top    Gold Top - SST    Light Blue Top       Medications Given in the Emergency Department:  Medications   sodium chloride 0.9 % flush 10 mL (has no administration in time range)   sodium chloride 0.9 % flush 10 mL (has no administration in time range)   ketorolac (TORADOL) injection 30 mg (30 mg Intravenous Given 6/9/25 1719)   iopamidol (ISOVUE-370) 76 % injection 100 mL (100 mL Intravenous Given 6/9/25 1744)        ED Course:    ED Course as of 06/09/25 1805 Mon Jun 09, 2025   1805 CBC & Differential(!)  The patient´s CBC that was reviewed and interpreted by me shows no abnormalities of critical concern. Of note, there is no anemia requiring a blood transfusion and the platelet count is acceptable. [AS]   1805 Comprehensive Metabolic Panel(!)  The patient´s CMP that was reviewed and interpretted by me shows no abnormalities of critical concern. Of note, the patient´s sodium and potassium are acceptable. The patient´s liver enzymes are unremarkable. The patient´s renal function (creatinine) is preserved. The patient has a normal anion gap. [AS]      ED Course User Index  [AS] Seaver, Alyce B, VENUS       Labs:    Lab Results (last 24 hours)       Procedure Component Value Units Date/Time    POC Urinalysis Dipstick, Multipro (Automated Dipstick) [[id number]]  (Normal) Collected: 06/09/25 1507    Specimen: Urine Updated: 06/09/25 1508     Color Yellow     Clarity, UA Clear     Glucose, UA Negative mg/dL       Bilirubin Negative     Ketones, UA Negative     Blood, UA Negative     Protein, POC Negative mg/dL      Urobilinogen, UA 0.2 E.U./dL     Nitrite, UA Negative     Leukocytes Negative     pH, Urine 7.0     Specific Gravity  1.010    Urinalysis With Culture If Indicated - Urine, Clean Catch [[id number]]  (Normal) Collected: 06/09/25 1707    Specimen: Urine, Clean Catch Updated: 06/09/25 1721     Color, UA Yellow     Appearance, UA Clear     pH, UA 7.5     Specific Gravity, UA 1.016     Glucose, UA Negative     Ketones, UA Negative     Bilirubin, UA Negative     Blood, UA Negative     Protein, UA Negative     Leuk Esterase, UA Negative     Nitrite, UA Negative     Urobilinogen, UA 1.0 E.U./dL    Narrative:      In absence of clinical symptoms, the presence of pyuria, bacteria, and/or nitrites on the urinalysis result does not correlate with infection.  Urine microscopic not indicated.    CBC & Differential [[id number]]  (Abnormal) Collected: 06/09/25 1718    Specimen: Blood Updated: 06/09/25 1737    Narrative:      The following orders were created for panel order CBC & Differential.  Procedure                               Abnormality         Status                     ---------                               -----------         ------                     CBC Auto Differential[[id number]]        Abnormal            Final result                 Please view results for these tests on the individual orders.    Comprehensive Metabolic Panel [[id number]]  (Abnormal) Collected: 06/09/25 1718    Specimen: Blood Updated: 06/09/25 1757     Glucose 106 mg/dL      BUN 10.4 mg/dL      Creatinine 0.56 mg/dL      Sodium 137 mmol/L      Potassium 3.7 mmol/L      Chloride 103 mmol/L      CO2 24.0 mmol/L      Calcium 8.8 mg/dL      Total Protein 7.0 g/dL      Albumin 4.2 g/dL      ALT (SGPT) 20 U/L      AST (SGOT) 14 U/L      Alkaline Phosphatase 66 U/L      Total Bilirubin 0.3 mg/dL      Globulin 2.8 gm/dL      A/G Ratio 1.5 g/dL       BUN/Creatinine Ratio 18.6     Anion Gap 10.0 mmol/L      eGFR 126.1 mL/min/1.73     Narrative:      GFR Categories in Chronic Kidney Disease (CKD)              GFR Category          GFR (mL/min/1.73)    Interpretation  G1                    90 or greater        Normal or high (1)  G2                    60-89                Mild decrease (1)  G3a                   45-59                Mild to moderate decrease  G3b                   30-44                Moderate to severe decrease  G4                    15-29                Severe decrease  G5                    14 or less           Kidney failure    (1)In the absence of evidence of kidney disease, neither GFR category G1 or G2 fulfill the criteria for CKD.    eGFR calculation 2021 CKD-EPI creatinine equation, which does not include race as a factor    Lipase [[id number]]  (Normal) Collected: 06/09/25 1718    Specimen: Blood Updated: 06/09/25 1757     Lipase 24 U/L     CBC Auto Differential [[id number]]  (Abnormal) Collected: 06/09/25 1718    Specimen: Blood Updated: 06/09/25 1737     WBC 7.90 10*3/mm3      RBC 4.20 10*6/mm3      Hemoglobin 12.7 g/dL      Hematocrit 38.7 %      MCV 92.1 fL      MCH 30.2 pg      MCHC 32.8 g/dL      RDW 11.9 %      RDW-SD 40.0 fl      MPV 11.8 fL      Platelets 243 10*3/mm3      Neutrophil % 58.1 %      Lymphocyte % 28.6 %      Monocyte % 5.9 %      Eosinophil % 6.2 %      Basophil % 0.9 %      Immature Grans % 0.3 %      Neutrophils, Absolute 4.59 10*3/mm3      Lymphocytes, Absolute 2.26 10*3/mm3      Monocytes, Absolute 0.47 10*3/mm3      Eosinophils, Absolute 0.49 10*3/mm3      Basophils, Absolute 0.07 10*3/mm3      Immature Grans, Absolute 0.02 10*3/mm3      nRBC 0.0 /100 WBC     MVP Vaginosis Panel - Swab, Vagina [[id number]] Collected: 06/09/25 1721    Specimen: Swab from Vagina Updated: 06/09/25 1728             Imaging:    CT Abdomen Pelvis With Contrast  Result Date: 6/9/2025  CT ABDOMEN PELVIS W CONTRAST Date of Exam: 6/9/2025  5:37 PM EDT Indication: suprapubic abdominal pain; recent dx kidney stone w/o ct confirmaiton. Comparison: 10/13/2022 Technique: Axial CT images were obtained of the abdomen and pelvis after the uneventful intravenous administration of iodinated contrast. Reconstructed coronal and sagittal images were also obtained. Automated exposure control and iterative construction methods were used. Findings: LUNG BASES:  Unremarkable without mass or infiltrate. LIVER:  Unremarkable parenchyma without focal lesion. BILIARY/GALLBLADDER:  Unremarkable SPLEEN:  Unremarkable PANCREAS:  Unremarkable ADRENAL:  Unremarkable KIDNEYS:  Unremarkable parenchyma with no solid mass identified. No obstruction.  No calculus identified. GASTROINTESTINAL/MESENTERY:  No evidence of obstruction nor inflammation.  The appendix is normal. MESENTERIC VESSELS:  Patent. AORTA/IVC:  Normal caliber. RETROPERITONEUM/LYMPH NODES:  Unremarkable REPRODUCTIVE:  Unremarkable BLADDER: Relatively decompressed. OSSEUS STRUCTURES:  Typical for age with no acute process identified.     Impression: No acute abnormality identified in the abdomen or pelvis is visualized. Electronically Signed: Bill Marlow MD  6/9/2025 5:56 PM EDT  Workstation ID: [id number]        Differential Diagnosis and Discussion:    Abdominal Pain: Based on the patient's signs and symptoms, I considered abdominal aortic aneurysm, small bowel obstruction, pancreatitis, acute cholecystitis, acute appendecitis, peptic ulcer disease, gastritis, colitis, endocrine disorders, irritable bowel syndrome and other differential diagnosis an etiology of the patient's abdominal pain.    PROCEDURES:    Labs were collected in the emergency department and all labs were reviewed and interpreted by me.  CT scan was performed in the emergency department and the CT scan radiology impression was interpreted by me.    No orders to display       Procedures    MDM     Amount and/or Complexity of Data  Reviewed  Clinical lab tests: reviewed  Tests in the radiology section of CPT®: reviewed             Patient Care Considerations:    ANTIBIOTICS: I considered prescribing antibiotics as an outpatient however no bacterial focus of infection was found.      Consultants/Shared Management Plan:    SHARED VISIT: I have discussed the case with my supervising physician, Dr. Ambrocio who states he agrees with plan of care. The substantive portion of the medical decision was made by the attesting physician who made or approve the management plan and will take responsibility for the patient.  Clinical findings were discussed and ultimate disposition was made in consult with supervising physician.    Social Determinants of Health:    Patient is independent, reliable, and has access to care.       Disposition and Care Coordination:    Discharged: The patient is suitable and stable for discharge with no need for consideration of admission.    I have explained the patient´s condition, diagnoses and treatment plan based on the information available to me at this time. I have answered questions and addressed any concerns. The patient has a good  understanding of the patient´s diagnosis, condition, and treatment plan as can be expected at this point. The vital signs have been stable. The patient´s condition is stable and appropriate for discharge from the emergency department.      The patient will pursue further outpatient evaluation with the primary care physician or other designated or consulting physician as outlined in the discharge instructions. They are agreeable to this plan of care and follow-up instructions have been explained in detail. The patient has received these instructions in written format and has expressed an understanding of the discharge instructions. The patient is aware that any significant change in condition or worsening of symptoms should prompt an immediate return to this or the closest emergency  department or call to 911.    Final diagnoses:   Suprapubic pain, acute        ED Disposition       ED Disposition   Discharge    Condition   Stable    Comment   --               This medical record created using voice recognition software.             Seaver, Alyce B, APRN  06/09/25 [id number]

## 2025-06-09 NOTE — DISCHARGE INSTRUCTIONS
Follow up with your primary care provider. Return to ER if symptoms worsen or fail to improve.  Alternate Tylenol and Ibuprofen for pain. Do not exceed 2400mg of ibuprofen or 4g of Tylenol in 24 hours (600mg of ibuprofen every 6 hours and 1g of Tylenol every 6 hours).  As discussed swab will be resulted later tonight, if this is positive we will alert you with treatment.  
no

## 2025-06-09 NOTE — ED PROVIDER NOTES
"SHARED VISIT ATTESTATION:    This visit was performed by myself and an APC.  I personally approved the management plan/medical decision making and take responsibility for the patient management.      SHARED VISIT NOTE:    Patient is 29 y.o. year old female that presents to the ED for evaluation of abdominal pain.     Physical Exam    ED Course:    /84 (BP Location: Right arm, Patient Position: Sitting)   Pulse 81   Temp 98.1 °F (36.7 °C) (Oral)   Resp 16   Ht 152.4 cm (60\")   Wt 71.2 kg (156 lb 15.5 oz)   SpO2 99%   BMI 30.66 kg/m²       The following orders were placed and all results were independently analyzed by me:  Orders Placed This Encounter   Procedures    MVP Vaginosis Panel - Swab, Vagina    CT Abdomen Pelvis With Contrast    [place] Draw    Comprehensive Metabolic Panel    Lipase    Urinalysis With Culture If Indicated -    CBC Auto Differential    CBC & Differential    Green Top (Gel)    Lavender Top    Gold Top - SST    Light Blue Top       Medications Given in the Emergency Department:  Medications   ketorolac (TORADOL) injection 30 mg (30 mg Intravenous Given 6/9/25 1719)   iopamidol (ISOVUE-370) 76 % injection 100 mL (100 mL Intravenous Given 6/9/25 1744)        ED Course:    ED Course as of 06/09/25 2227 Mon Jun 09, 2025   1805 CBC & Differential(!)  The patient´s CBC that was reviewed and interpreted by me shows no abnormalities of critical concern. Of note, there is no anemia requiring a blood transfusion and the platelet count is acceptable. [AS]   1805 Comprehensive Metabolic Panel(!)  The patient´s CMP that was reviewed and interpretted by me shows no abnormalities of critical concern. Of note, the patient´s sodium and potassium are acceptable. The patient´s liver enzymes are unremarkable. The patient´s renal function (creatinine) is preserved. The patient has a normal anion gap. [AS]      ED Course User Index  [AS] Seaver, Alyce B, VENUS       Labs:    Lab Results (last 24 " hours)       Procedure Component Value Units Date/Time    POC Urinalysis Dipstick, Multipro (Automated Dipstick) [[id number]]  (Normal) Collected: 06/09/25 1507    Specimen: Urine Updated: 06/09/25 1508     Color Yellow     Clarity, UA Clear     Glucose, UA Negative mg/dL      Bilirubin Negative     Ketones, UA Negative     Blood, UA Negative     Protein, POC Negative mg/dL      Urobilinogen, UA 0.2 E.U./dL     Nitrite, UA Negative     Leukocytes Negative     pH, Urine 7.0     Specific Gravity  1.010    Urinalysis With Culture If Indicated - Urine, Clean Catch [[id number]]  (Normal) Collected: 06/09/25 1707    Specimen: Urine, Clean Catch Updated: 06/09/25 1721     Color, UA Yellow     Appearance, UA Clear     pH, UA 7.5     Specific Gravity, UA 1.016     Glucose, UA Negative     Ketones, UA Negative     Bilirubin, UA Negative     Blood, UA Negative     Protein, UA Negative     Leuk Esterase, UA Negative     Nitrite, UA Negative     Urobilinogen, UA 1.0 E.U./dL    Narrative:      In absence of clinical symptoms, the presence of pyuria, bacteria, and/or nitrites on the urinalysis result does not correlate with infection.  Urine microscopic not indicated.    CBC & Differential [[id number]]  (Abnormal) Collected: 06/09/25 1718    Specimen: Blood Updated: 06/09/25 1737    Narrative:      The following orders were created for panel order CBC & Differential.  Procedure                               Abnormality         Status                     ---------                               -----------         ------                     CBC Auto Differential[[id number]]        Abnormal            Final result                 Please view results for these tests on the individual orders.    Comprehensive Metabolic Panel [[id number]]  (Abnormal) Collected: 06/09/25 1718    Specimen: Blood Updated: 06/09/25 1757     Glucose 106 mg/dL      BUN 10.4 mg/dL      Creatinine 0.56 mg/dL      Sodium 137 mmol/L      Potassium 3.7 mmol/L       Chloride 103 mmol/L      CO2 24.0 mmol/L      Calcium 8.8 mg/dL      Total Protein 7.0 g/dL      Albumin 4.2 g/dL      ALT (SGPT) 20 U/L      AST (SGOT) 14 U/L      Alkaline Phosphatase 66 U/L      Total Bilirubin 0.3 mg/dL      Globulin 2.8 gm/dL      A/G Ratio 1.5 g/dL      BUN/Creatinine Ratio 18.6     Anion Gap 10.0 mmol/L      eGFR 126.1 mL/min/1.73     Narrative:      GFR Categories in Chronic Kidney Disease (CKD)              GFR Category          GFR (mL/min/1.73)    Interpretation  G1                    90 or greater        Normal or high (1)  G2                    60-89                Mild decrease (1)  G3a                   45-59                Mild to moderate decrease  G3b                   30-44                Moderate to severe decrease  G4                    15-29                Severe decrease  G5                    14 or less           Kidney failure    (1)In the absence of evidence of kidney disease, neither GFR category G1 or G2 fulfill the criteria for CKD.    eGFR calculation 2021 CKD-EPI creatinine equation, which does not include race as a factor    Lipase [[id number]]  (Normal) Collected: 06/09/25 1718    Specimen: Blood Updated: 06/09/25 1757     Lipase 24 U/L     CBC Auto Differential [[id number]]  (Abnormal) Collected: 06/09/25 1718    Specimen: Blood Updated: 06/09/25 1737     WBC 7.90 10*3/mm3      RBC 4.20 10*6/mm3      Hemoglobin 12.7 g/dL      Hematocrit 38.7 %      MCV 92.1 fL      MCH 30.2 pg      MCHC 32.8 g/dL      RDW 11.9 %      RDW-SD 40.0 fl      MPV 11.8 fL      Platelets 243 10*3/mm3      Neutrophil % 58.1 %      Lymphocyte % 28.6 %      Monocyte % 5.9 %      Eosinophil % 6.2 %      Basophil % 0.9 %      Immature Grans % 0.3 %      Neutrophils, Absolute 4.59 10*3/mm3      Lymphocytes, Absolute 2.26 10*3/mm3      Monocytes, Absolute 0.47 10*3/mm3      Eosinophils, Absolute 0.49 10*3/mm3      Basophils, Absolute 0.07 10*3/mm3      Immature Grans, Absolute 0.02 10*3/mm3       nRBC 0.0 /100 WBC     MVP Vaginosis Panel - Swab, Vagina [[id number]]  (Abnormal) Collected: 06/09/25 1721    Specimen: Swab from Vagina Updated: 06/09/25 1837     Bacterial vaginosis Positive     Candida glabrata/krusei Not Detected     CANDIDA GROUP Not Detected     Comment: Candida group contains one or more of the following: C. albicans, C. tropicalis, C. parapsilosis or C. dubliniensis.        Trichomonas vaginalis Not Detected             Imaging:    CT Abdomen Pelvis With Contrast  Result Date: 6/9/2025  CT ABDOMEN PELVIS W CONTRAST Date of Exam: 6/9/2025 5:37 PM EDT Indication: suprapubic abdominal pain; recent dx kidney stone w/o ct confirmaiton. Comparison: 10/13/2022 Technique: Axial CT images were obtained of the abdomen and pelvis after the uneventful intravenous administration of iodinated contrast. Reconstructed coronal and sagittal images were also obtained. Automated exposure control and iterative construction methods were used. Findings: LUNG BASES:  Unremarkable without mass or infiltrate. LIVER:  Unremarkable parenchyma without focal lesion. BILIARY/GALLBLADDER:  Unremarkable SPLEEN:  Unremarkable PANCREAS:  Unremarkable ADRENAL:  Unremarkable KIDNEYS:  Unremarkable parenchyma with no solid mass identified. No obstruction.  No calculus identified. GASTROINTESTINAL/MESENTERY:  No evidence of obstruction nor inflammation.  The appendix is normal. MESENTERIC VESSELS:  Patent. AORTA/IVC:  Normal caliber. RETROPERITONEUM/LYMPH NODES:  Unremarkable REPRODUCTIVE:  Unremarkable BLADDER: Relatively decompressed. OSSEUS STRUCTURES:  Typical for age with no acute process identified.     Impression: No acute abnormality identified in the abdomen or pelvis is visualized. Electronically Signed: Bill Marlow MD  6/9/2025 5:56 PM EDT  Workstation ID: [id number]      MDM:    Procedures    Labs were collected in the emergency department and all labs were reviewed and interpreted by me.  CT scan was performed in the  emergency department and the CT scan radiology impression was interpreted by me.                     Bhaskar Ambrocio MD  22:27 EDT  06/09/25         Bhaskar Ambrocio MD  06/09/25 8038